# Patient Record
(demographics unavailable — no encounter records)

---

## 2019-10-21 NOTE — DIAGNOSTIC IMAGING REPORT
INDICATION: Cough.



Upright AP view of the chest is obtained with comparison made to

study of one day earlier.



FINDINGS: Heart size remains within normal limits. Pulmonary

vascularity has increased and there are prominent interstitial

markings throughout the lungs. No pneumothorax or consolidation

is identified. Degenerative changes are present at the right

acromioclavicular joint.



IMPRESSION: Increasing interstitial markings in the lungs may be

due to edema or pneumonitis. No consolidation, pneumothorax or

other adverse change is identified.



Dictated by: 



  Dictated on workstation # [id number]

## 2019-10-21 NOTE — NUR
SEAMUS LUJAN admitted to room 422-1, with an admitting diagnosis of abdominal pain, 
gastric ulcer, uti, colitis, alcoholism, on 10/20/19 from er via wheelchair, accompanied by 
er staff. SEAMUS LUJAN introduced to surroundings, call light, bed controls, phone, TV, 
temperature control, lights, meal times, smoking policy, visitor policy, side rail policy, 
bathrooms and showers.  Patient Rights given to patient in the handbook. SEAMUS LUJAN 
verbalizes understanding that [facility] is not responsible for the loss or damage to any 
personal effects or valuables that are kept in the patients possession during their 
hospitalization.

## 2019-10-21 NOTE — PROGRESS NOTE - SURGERY
Subjective


Time Seen by a Provider:  13:04


Subjective/Events-last exam


Pt seen and examined, no new complaints...still has abdominal pain. States she 

is hungry and wants to eat.


Review of Systems


General:  No Chills, No Night Sweats


Pulmonary:  No Dyspnea, No Cough


Cardiovascular:  No: Chest Pain, Palpitations


Gastrointestinal:  No: Nausea, Vomiting





Objective


Exam





Vital Signs








  Date Time  Temp Pulse Resp B/P (MAP) Pulse Ox O2 Delivery O2 Flow Rate FiO2


 


10/21/19 08:00     97 Room Air  


 


10/21/19 08:00 36.5 71 18 128/73 (91) 97 Room Air  


 


10/21/19 03:00 36.7 72 18 129/72 (91) 96 Room Air  


 


10/21/19 02:06 36.8 68 16 108/67 (81) 96 Room Air  


 


10/21/19 00:54 36.5 79 18 110/70 98 Room Air  


 


10/21/19 00:53 36.5 79 18 110/70 (83) 98 Room Air  


 


10/21/19 00:46 36.0 78 18 103/56 (110) 98   


 


10/21/19 00:45     96 Room Air  


 


10/20/19 21:00 36.0 73 18 152/89 (110) 97   














I & O 


 


 10/21/19





 07:00


 


Intake Total 1310 ml


 


Output Total 600 ml


 


Balance 710 ml





Capillary Refill : Less Than 3 SecondsLess Than 3 Seconds


General Appearance:  No Apparent Distress, Thin


HEENT:  Normal ENT Inspection


Neck:  Full Range of Motion, Normal Inspection


Respiratory:  Chest Non Tender, Lungs Clear, No Accessory Muscle Use, No 

Respiratory Distress


Cardiovascular:  Regular Rate, Rhythm, No Murmur


Gastrointestinal:  normal bowel sounds, soft, no organomegaly, no pulsatile 

mass; No distended, No guarding, No rebound; tenderness (MODERATE LLQ AND 

SUPRAPUBIC TENDERNESS, MILD EPIGASTRIC TENDERNESS); No hernia, No mass





Results


Lab


Laboratory Tests


10/20/19 21:15: 


White Blood Count 9.6, Red Blood Count 3.93L, Hemoglobin 13.6, Hematocrit 39, 

Mean Corpuscular Volume 99, Mean Corpuscular Hemoglobin 35H, Mean Corpuscular He

moglobin Concent 35, Red Cell Distribution Width 11.4, Platelet Count 373, Mean 

Platelet Volume 9.2, Neutrophils (%) (Auto) 61, Lymphocytes (%) (Auto) 30, 

Monocytes (%) (Auto) 7, Eosinophils (%) (Auto) 2, Basophils (%) (Auto) 0, 

Neutrophils # (Auto) 5.8, Lymphocytes # (Auto) 2.9, Monocytes # (Auto) 0.7, 

Eosinophils # (Auto) 0.2, Basophils # (Auto) 0.0, Prothrombin Time 12.9, INR Co

mment 0.9, Activated Partial Thromboplast Time 29, Sodium Level 135, Potassium 

Level 3.7, Chloride Level 100, Carbon Dioxide Level 25, Anion Gap 10, Blood Urea

Nitrogen 16, Creatinine 0.61, Estimat Glomerular Filtration Rate > 60, 

BUN/Creatinine Ratio 26, Glucose Level 86, Calcium Level 8.8, Corrected Calcium 

8.8, Magnesium Level 1.9, Total Bilirubin 0.2, Aspartate Amino Transf (AST/SGOT)

15, Alanine Aminotransferase (ALT/SGPT) 16, Alkaline Phosphatase 126, Total 

Protein 6.8, Albumin 4.0, Amylase Level 56, Lipase 23, Acetaminophen Level < 10L

, Serum Alcohol 118H


10/20/19 21:30: 


Urine Color YELLOW, Urine Clarity CLEAR, Urine pH 5, Urine Specific Gravity 

1.015L, Urine Protein NEGATIVE, Urine Glucose (UA) NEGATIVE, Urine Ketones 

NEGATIVE, Urine Nitrite NEGATIVE, Urine Bilirubin NEGATIVE, Urine Urobilinogen 

NORMAL, Urine Leukocyte Esterase 2+H, Urine RBC (Auto) NEGATIVE, Urine RBC NONE,

Urine WBC 5-10H, Urine Squamous Epithelial Cells 10-25H, Urine Crystals NONE, 

Urine Bacteria TRACE, Urine Casts NONE, Urine Mucus MODERATEH, Urine Trichomonas

LARGEH, Urine Culture Indicated NO, Urine Opiates Screen NEGATIVE, Urine 

Oxycodone Screen NEGATIVE, Urine Methadone Screen NEGATIVE, Urine Propoxyphene 

Screen NEGATIVE, Urine Barbiturates Screen NEGATIVE, Ur Tricyclic 

Antidepressants Screen NEGATIVE, Urine Phencyclidine Screen NEGATIVE, Urine 

Amphetamines Screen NEGATIVE, Urine Methamphetamines Screen NEGATIVE, Urine 

Benzodiazepines Screen NEGATIVE, Urine Cocaine Screen NEGATIVE, Urine 

Cannabinoids Screen NEGATIVE


10/21/19 05:45: 


White Blood Count 10.5, Red Blood Count 3.71L, Hemoglobin 12.5, Hematocrit 37, 

Mean Corpuscular Volume 100H, Mean Corpuscular Hemoglobin 34, Mean Corpuscular 

Hemoglobin Concent 34, Red Cell Distribution Width 11.5, Platelet Count 327, 

Mean Platelet Volume 9.2, Neutrophils (%) (Auto) 81H, Lymphocytes (%) (Auto) 12,

Monocytes (%) (Auto) 6, Eosinophils (%) (Auto) 1, Basophils (%) (Auto) 0, 

Neutrophils # (Auto) 8.5H, Lymphocytes # (Auto) 1.2, Monocytes # (Auto) 0.6, 

Eosinophils # (Auto) 0.1, Basophils # (Auto) 0.0, Sodium Level 134L, Potassium 

Level 3.8, Chloride Level 103, Carbon Dioxide Level 23, Anion Gap 8, Blood Urea 

Nitrogen 12, Creatinine 0.57L, Estimat Glomerular Filtration Rate > 60, BUN/C

reatinine Ratio 21, Glucose Level 123H, Calcium Level 8.0L, Corrected Calcium 

8.5, Total Bilirubin 0.4, Aspartate Amino Transf (AST/SGOT) 13, Alanine 

Aminotransferase (ALT/SGPT) 14, Alkaline Phosphatase 111, Total Protein 5.8L, 

Albumin 3.4





Assessment/Plan


LUQ pain


Gastric Wall thickening





Plan to do EGD today, discussed risks and complications not limited to pain, 

bleeding, infection and even esophageal perforation.  All questions answered to 

pt's satisfaction.





Clinical Quality Measures


DVT/VTE Risk/Contraindication:


Risk Factor Score Per Nursing:  3


RFS Level Per Nursing on Admit:  3=High


Contraindications-Pharm:  Other *list below*











BETH GUERRERO DO               Oct 21, 2019 13:23

## 2019-10-21 NOTE — NUR
SPOKE WITH PT AS WELL AS GOING THRU THE EXT MED HISTORY TO COMPLETE THE MED REC.



PT STATES SHE DOES NOT TAKE ANY PRESCRIPTION MEDS.



OTC MEDS:

IBUPROFEN : 2 TABS Q 6 H PRN 

ASPIRIN 81M DAILY

MTV: 1 DAILY

## 2019-10-21 NOTE — DIAGNOSTIC IMAGING REPORT
PROCEDURE: CT urinary tract, rule out kidney stone.



TECHNIQUE: Multiple contiguous axial images were obtained through

the abdomen and pelvis without the use of intravenous contrast.

Auto Exposure Controls were utilized during the CT exam to meet

ALARA standards for radiation dose reduction. 



INDICATION:  Abdominal pain.



FINDINGS: Unenhanced images of the liver and spleen reveal no

focal abnormality. There is no evidence of gallbladder,

pancreatic, adrenal gland or acute renal abnormality. There is

extensive mural thickening throughout the distal body and antrum

of the stomach. There is apparent ulceration along the greater

curvature of the stomach anteriorly with gas reaching the serosal

surface. No definite free fluid is identified. There is no

organized fluid collection in the abdomen or pelvis. Bladder is

unopacified but otherwise unremarkable. There is no evidence of

appendiceal region inflammation.



IMPRESSION: Findings are compatible with advanced gastritis and

apparent peptic ulcer disease with ventral antral ulceration

resulting in marked thinning of the anterior wall. Endoscopic

assessment would be of use.







Dictated by: 



  Dictated on workstation # [id number]

## 2019-10-21 NOTE — CONSULTATION - SURGERY
History of Present Illness


History of Present Illness


Patient Consulted On(nakul/time)


10/20/19


 23:58


Time Seen by Provider:  23:39


History of Present Illness


Surgery asked to consult regarding LUQ.





HPI: Pt is a 59 yo female who presented to the ER with severe LUQ pain, rating 

it as 10 out of 10.  Pt states she was just sitting watching football, drinking 

a beer "when it came out of nowhere".   Pt states last week she "had a bug" with

nausea and vomiting


but all that eventually went away.  She states she went to work today and had no

problems.  Pt states she has never had pain like this before.  She is not sure 

what makes it worse, only the pain meds have so far made the pain better.   Pain

does not radiate


anywhere.





Allergies and Home Medications


Allergies


Coded Allergies:  


     No Known Drug Allergies (Unverified , 10/20/19)





Patient Home Medication List


Home Medication List Reviewed:  Yes





Past Medical-Social-Family Hx


Patient Social History


Alcohol Use:  Regular Use


Recreational Drug Use:  No


Smoking Status:  Current Everyday Smoker (2 ppd)


Type Used:  Cigarettes


Recent Foreign Travel:  No


Contact w/Someone Who Travel:  No


Recent Infectious Disease Expo:  No


Recent Hopitalizations:  No





Seasonal Allergies


Seasonal Allergies:  No





Surgeries


History of Surgeries:  Yes


Surgeries:  Coronary Stent





Respiratory


History of Respiratory Disorde:  No





Cardiovascular


History of Cardiac Disorders:  No





Neurological


History of Neurological Disord:  No





Reproductive System


GYN History:  Menopausal





Genitourinary


History of Genitourinary Disor:  No





Gastrointestinal


History of Gastrointestinal Di:  No





Musculoskeletal


History of Musculoskeletal Dis:  No





Endocrine


History of Endocrine Disorders:  No





HEENT


History of HEENT Disorders:  No





Cancer


History of Cancer:  No





Psychosocial


History of Psychiatric Problem:  No





Integumentary


History of Skin or Integumenta:  No





Blood Transfusions


History of Blood Disorders:  No





Family Medical History


Significant Family History:  Cancer (Father had Prostate CA), Diabetes (Father),

Hypertension (Mother)





Review of Systems-General


Constitutional:  No chills, No diaphoresis; weakness


EENTM:  No mouth pain, No mouth swelling, No epistaxis


Respiratory:  No cough, No dyspnea on exertion, No hemoptysis, No short of 

breath


Cardiovascular:  No chest pain, No edema, No palpitations


Gastrointestinal:  LUQ; No dysphagia, No hematemesis, No jaundice; nausea, 

vomiting


Genitourinary:  No dysuria, No frequency, No hematuria


Musculoskeletal:  back pain, joint pain, joint swelling, muscle stiffness


Skin:  No change in color, No change in hair/nails


Psychiatric/Neurological:  Denies Anxiety, Denies Depressed, Denies Seizure, 

Denies Tremors


Other


Pt denies any history of abnormal bleeding or bruising





Physical Exam-General Problems


Physical Exam


Vital Signs





Vital Signs - First Documented








 10/20/19





 21:00


 


Temp 36.0


 


Pulse 73


 


Resp 18


 


B/P (MAP) 152/89 (110)


 


Pulse Ox 97





Capillary Refill : Less Than 3 Seconds


General Appearance:  mild distress, thin


Eyes:  Bilateral Eye PERRL, Bilateral Eye EOMI


HEENT:  pharynx normal; No scleral icterus (R), No scleral icterus (L)


Neck:  non-tender, full range of motion, supple


Respiratory:  chest non-tender, lungs clear, normal breath sounds, no 

respiratory distress, no accessory muscle use


Cardiovascular:  regular rate, rhythm, no murmur


Gastrointestinal:  normal bowel sounds, soft, no organomegaly, tenderness (LUQ)


Back:  no CVA tenderness, no vertebral tenderness


Extremities:  normal range of motion, normal inspection, no pedal edema, no calf

tenderness


Neurologic/Psychiatric:  CNs II-XII nml as tested, no motor/sensory deficits, 

alert, normal mood/affect, oriented x 3


Skin:  normal color, warm/dry


Lymphatic:  no adenopathy (neck, axilla or groin)





Data Review


Labs


Laboratory Tests


10/20/19 21:15: 


White Blood Count 9.6, Red Blood Count 3.93L, Hemoglobin 13.6, Hematocrit 39, 

Mean Corpuscular Volume 99, Mean Corpuscular Hemoglobin 35H, Mean Corpuscular 

Hemoglobin Concent 35, Red Cell Distribution Width 11.4, Platelet Count 373, 

Mean Platelet Volume 9.2, Neutrophils (%) (Auto) 61, Lymphocytes (%) (Auto) 30, 

Monocytes (%) (Auto) 7, Eosinophils (%) (Auto) 2, Basophils (%) (Auto) 0, 

Neutrophils # (Auto) 5.8, Lymphocytes # (Auto) 2.9, Monocytes # (Auto) 0.7, 

Eosinophils # (Auto) 0.2, Basophils # (Auto) 0.0, Prothrombin Time 12.9, INR 

Comment 0.9, Activated Partial Thromboplast Time 29, Sodium Level 135, Potassium

Level 3.7, Chloride Level 100, Carbon Dioxide Level 25, Anion Gap 10, Blood Urea

Nitrogen 16, Creatinine 0.61, Estimat Glomerular Filtration Rate > 60, 

BUN/Creatinine Ratio 26, Glucose Level 86, Calcium Level 8.8, Corrected Calcium 

8.8, Magnesium Level 1.9, Total Bilirubin 0.2, Aspartate Amino Transf (AST/SGOT)

15, Alanine Aminotransferase (ALT/SGPT) 16, Alkaline Phosphatase 126, Total 

Protein 6.8, Albumin 4.0, Amylase Level 56, Lipase 23, Acetaminophen Level < 10L

, Serum Alcohol 118H


10/20/19 21:30: 


Urine Color YELLOW, Urine Clarity CLEAR, Urine pH 5, Urine Specific Gravity 

1.015L, Urine Protein NEGATIVE, Urine Glucose (UA) NEGATIVE, Urine Ketones 

NEGATIVE, Urine Nitrite NEGATIVE, Urine Bilirubin NEGATIVE, Urine Urobilinogen 

NORMAL, Urine Leukocyte Esterase 2+H, Urine RBC (Auto) NEGATIVE, Urine RBC NONE,

Urine WBC 5-10H, Urine Squamous Epithelial Cells 10-25H, Urine Crystals NONE, 

Urine Bacteria TRACE, Urine Casts NONE, Urine Mucus MODERATEH, Urine Trichomonas

LARGEH, Urine Culture Indicated NO, Urine Opiates Screen NEGATIVE, Urine 

Oxycodone Screen NEGATIVE, Urine Methadone Screen NEGATIVE, Urine Propoxyphene 

Screen NEGATIVE, Urine Barbiturates Screen NEGATIVE, Ur Tricyclic 

Antidepressants Screen NEGATIVE, Urine Phencyclidine Screen NEGATIVE, Urine 

Amphetamines Screen NEGATIVE, Urine Methamphetamines Screen NEGATIVE, Urine 

Benzodiazepines Screen NEGATIVE, Urine Cocaine Screen NEGATIVE, Urine 

Cannabinoids Screen NEGATIVE





Assessment/Plan


LUQ pain


Gastric Wall thickening





Pt has never had an EGD or colonoscopy; an initial Stat Rad Reading supposedly 

called the CT pending gastric perforation.  She is being admitted to medicine 

with IVF, anti-emetics and pain control.  She will need IV Protonix BID.  She 

needs at


least an EGD and I recommend a colonoscopy; might be able to do EGD during this 

admission.











BETH GUERRERO DO               Oct 21, 2019 00:07

## 2019-10-21 NOTE — HISTORY & PHYSICAL
History of Present Illness


History of Present Illness


Reason for visit/HPI


Patient last night at 7 p.m. had severe left lower quadrant pain.


Patient came out to the emergency room.


CAT scan shows gastric ulcer.


Previous surgeries appendectomy and tubal ligation.


Family history mother diabetic in bed diabetic and cancer prostatic.


This morning pain in the left lower quadrant.


Patient never had EGD or colonoscopy.


Patient did drink some beers yesterday


Date of Admission


Oct 20, 2019 at 23:15


Time Seen by a Provider:  08:43


I consulted on this patient on


10/21/19


 08:43


Attending Physician


Kirill Luna DO


Admitting Physician


No,Local Physician


Consult








Allergies and Home Medications


Allergies


Coded Allergies:  


     No Known Drug Allergies (Unverified , 10/20/19)





Home Medications


Aspirin 81 Mg Tablet.dr, 81 MG PO DAILY, (Reported)





Patient Home Medication List


Home Medication List Reviewed:  Yes





Past Medical-Social-Family Hx


Past Med/Social Hx:  Reviewed and Corrections made


Patient Social History


Alcohol Use:  Regular Use (AT LEAST A 6 PACK OF BEER/DAY, PER PT ON 10/20/19)


Alcohol Beverage of Choice:  Beer


Recreational Drug Use:  No


Smoking Status:  Current Everyday Smoker (2 PPD)


Type Used:  Cigarettes (2 PPD)


Recent Foreign Travel:  No


Contact w/other who traveled:  No


Recent Hopitalizations:  No


Recent Infectious Disease Expo:  No





Seasonal Allergies


Seasonal Allergies:  No





Past Medical History


Surgeries:  Appendectomy, Cardiac, Tubal Ligation


Currently Using BIPAP:  No


Tubal Ligation, Menopausal


History of Blood Disorders:  No





Family History





Diabetes mellitus


  19 MOTHER


FH: prostate cancer


  19 FATHER


Hypertension


  19 FATHER


Cancer (Father had Prostate CA), Diabetes (Father), Hypertension (Mother)





Review of Systems


Constitutional:  no symptoms reported


EENTM:  no symptoms reported


Respiratory:  no symptoms reported


Cardiovascular:  no symptoms reported


Gastrointestinal:  LLQ


Genitourinary:  no symptoms reported


Pregnant:  No





Physical Exam


Vital Signs





Vital Signs - First Documented








 10/20/19 10/21/19





 21:00 00:45


 


Temp 36.0 


 


Pulse 73 


 


Resp 18 


 


B/P (MAP) 152/89 (110) 


 


Pulse Ox 97 


 


O2 Delivery  Room Air





Capillary Refill : Less Than 3 Seconds


Height, Weight, BMI


Height: '"


Weight: lbs. oz. kg; 15.04 BMI


Method:


General Appearance:  No Apparent Distress, Thin


Eyes:  Bilateral Eye Normal Inspection


HEENT:  Normal ENT Inspection


Neck:  Full Range of Motion, Normal Inspection


Respiratory:  Chest Non Tender, Lungs Clear, No Accessory Muscle Use, No 

Respiratory Distress


Cardiovascular:  Regular Rate, Rhythm, No Murmur


Gastrointestinal:  Soft, Other (Abdominal pain and left lower quadrant this 

a.m.)





Assessment/Plan


Assessment and Plan


Abdominal pain left lower quadrant.


Elevated alcohol level.


Gastric ulcer.





Admission Diagnosis


Admission Status:  Inpatient Order (span 2 midnights)


Reason for Inpatient Admission:  


Left lower quadrant abdominal pain.


Vomiting.


Ulcer gastric





Clinical Quality Measures


DVT/VTE Risk/Contraindication:


Risk Factor Score Per Nursing:  3


RFS Level Per Nursing on Admit:  3=High











KIRILL LUNA DO         Oct 21, 2019 08:48

## 2019-10-21 NOTE — ED ABDOMINAL PAIN
General


Chief Complaint:  Abdominal/GI Problems


Stated Complaint:  ABDOMINAL PAIN; GASTRIC ULCER; UTI;  ALCOHOLISM


Nursing Triage Note:  


c/o pain in LLQ, STARTED THIS EVENING, SHARP PAIN 8/10


Sepsis Screen:  No Definite Risk


Source of Information:  Patient





History of Present Illness


Date Seen by Provider:  Oct 20, 2019


Time Seen by Provider:  21:05


Initial Comments


PT ARRIVES VIA POV FROM HOME


STATES SHE STARTED GETTING SICK LAST WEEK-HAD NAUSEA, AND DRY HEAVES. GOT BETTER

AFTER A COUPLE OF DAYS


STATES SHE WAS AT WORK TODAY AND HAD ALOT OF PAIN IN MID UPPER ABDOMEN, BUT WENT

AWAY


STATES TODAY, SHE GOT HOME FROM WORK, SAT DOWN AND DRANK " A BEER" AND BEGAN 

HAVING VERY SUDDEN ONSET OF SEVERE SHARP PAIN IN LLQ. 


PAIN BEGAN AROUND 1945 TONIGHT


PT IS WORSE WITH MOVEMENTS OR STANDING UP STRAIGHT. 


NO NAUSEA TODAY. 


HAD NORMAL BM THIS AM


NO FEVER TODAY


NO URINARY SYMPTOMS 





HAS NOT TAKEN ANYTHING FOR SYMPTOMS 





NO HISTORY OF GI PROBLEMS


STATES SHE HAD HER APPENDIX OUT MANY YEARS AGO, AND HAD BTL YEARS AGO. OTHERWISE

NO ABDOMINAL SURGERIES


DENIES ANY HISTORY OF GI PROBLEMS





PT STATES SHE SMOKES 2 PPD, AND DRINKS AT LEAST A 6 PACK OF BEER EVERY DAY. 


DENIES DRUG USE





PCP: DR. LUNA, NOT SEEN FOR ABOUT 2 YEARS





Allergies and Home Medications


Allergies


Coded Allergies:  


     No Known Drug Allergies (Unverified , 10/20/19)





Home Medications


Aspirin 81 Mg Tablet.dr, 81 MG PO DAILY, (Reported)





Patient Home Medication List


Home Medication List Reviewed:  Yes





Review of Systems


Review of Systems


Constitutional:  no symptoms reported; No chills, No diaphoresis, No dizziness, 

No fever


EENTM:  No Symptoms Reported


Respiratory:  No Symptoms Reported; Denies Cough, Denies Shortness of Air


Cardiovascular:  No Symptoms Reported; Denies Chest Pain, Denies Edema, Denies 

Lightheadedness, Denies Palpitations, Denies Syncope


Gastrointestinal:  See HPI, Abdominal Pain; Denies Constipated, Denies Diarrhea;

Nausea; Denies Poor Fluid Intake, Denies Vomiting


Genitourinary:  No Symptoms Reported


Musculoskeletal:  no symptoms reported; No back pain


Skin:  no symptoms reported


Psychiatric/Neurological:  No Symptoms Reported


Endocrine:  No Symptoms Reported


Hematologic/Lymphatic:  No Symptoms Reported





Past Medical-Social-Family Hx


Past Med/Social Hx:  Reviewed and Corrections made


Patient Social History


Alcohol Use:  Regular Use (AT LEAST A 6 PACK OF BEER/DAY, PER PT ON 10/20/19)


Alcohol Beverage of Choice:  Beer


Recreational Drug Use:  No


Smoking Status:  Current Everyday Smoker (2 PPD)


Type Used:  Cigarettes (2 PPD)


Recent Foreign Travel:  No


Contact w/Someone Who Travel:  No


Recent Infectious Disease Expo:  No


Recent Hopitalizations:  No


Physical Abuse:  No


Sexual Abuse:  No


Mistreated:  No


Fear:  No





Seasonal Allergies


Seasonal Allergies:  No





Past Medical History


Surgeries:  Yes (CARDIAC CATH 11  YEARS AGO--NO INTERVENTION, PER PT ON 

10/20/19)


Appendectomy, Cardiac, Tubal Ligation


Respiratory:  No


Cardiac:  No


Neurological:  No


GYN History:  Tubal Ligation, Menopausal


Genitourinary:  No


Gastrointestinal:  No


Musculoskeletal:  No


Endocrine:  No


HEENT:  No


Cancer:  No


Psychosocial:  No


Integumentary:  No


Blood Disorders:  No





Family Medical History





Diabetes mellitus


  19 MOTHER


FH: prostate cancer


  19 FATHER


Hypertension


  19 FATHER


Cancer (Father had Prostate CA), Diabetes (Father), Hypertension (Mother)





Physical Exam


Vital Signs





Vital Signs - First Documented








 10/20/19





 21:00


 


Temp 36.0


 


Pulse 73


 


Resp 18


 


B/P (MAP) 152/89 (110)


 


Pulse Ox 97





Capillary Refill : Less Than 3 Seconds


Height/Weight/BMI


Height: '"


Weight: lbs. oz. kg; 15.04 BMI


Method:


General Appearance:  no apparent distress, thin, other (REEKS OF ETOH, SMILLING,

TALKS NON-STOP AT LENGTH. DOES NOT APPEAR TO BE IN ANY DISCOMFORT OR DISTRESS)


HEENT:  PERRL/EOMI


Neck:  normal inspection


Respiratory:  normal breath sounds, no respiratory distress, no accessory muscle

use


Cardiovascular:  normal peripheral pulses, regular rate, rhythm, no edema, no 

JVD, no murmur


Gastrointestinal:  normal bowel sounds, soft, no organomegaly, no pulsatile 

mass; No distended, No guarding, No rebound; tenderness (MODERATE LLQ AND 

SUPRAPUBIC TENDERNESS, MILD EPIGASTRIC TENDERNESS); No hernia, No mass


Extremities:  normal range of motion, non-tender, normal inspection, no pedal 

edema, no calf tenderness, normal capillary refill


Back:  normal inspection, no CVA tenderness, no vertebral tenderness


Neurologic/Psychiatric:  CNs II-XII nml as tested, no motor/sensory deficits, 

alert, normal mood/affect, oriented x 3


Skin:  normal color, warm/dry





Progress/Results/Core Measures


Results/Orders


Lab Results





Laboratory Tests








Test


 10/20/19


21:15 10/20/19


21:30 Range/Units


 


 


White Blood Count


 9.6 


 


 4.3-11.0


10^3/uL


 


Red Blood Count


 3.93 L


 


 4.35-5.85


10^6/uL


 


Hemoglobin 13.6   11.5-16.0  G/DL


 


Hematocrit 39   35-52  %


 


Mean Corpuscular Volume 99   80-99  FL


 


Mean Corpuscular Hemoglobin 35 H  25-34  PG


 


Mean Corpuscular Hemoglobin


Concent 35 


 


 32-36  G/DL





 


Red Cell Distribution Width 11.4   10.0-14.5  %


 


Platelet Count


 373 


 


 130-400


10^3/uL


 


Mean Platelet Volume 9.2   7.4-10.4  FL


 


Neutrophils (%) (Auto) 61   42-75  %


 


Lymphocytes (%) (Auto) 30   12-44  %


 


Monocytes (%) (Auto) 7   0-12  %


 


Eosinophils (%) (Auto) 2   0-10  %


 


Basophils (%) (Auto) 0   0-10  %


 


Neutrophils # (Auto) 5.8   1.8-7.8  X 10^3


 


Lymphocytes # (Auto) 2.9   1.0-4.0  X 10^3


 


Monocytes # (Auto) 0.7   0.0-1.0  X 10^3


 


Eosinophils # (Auto)


 0.2 


 


 0.0-0.3


10^3/uL


 


Basophils # (Auto)


 0.0 


 


 0.0-0.1


10^3/uL


 


Prothrombin Time 12.9   12.2-14.7  SEC


 


INR Comment 0.9   0.8-1.4  


 


Activated Partial


Thromboplast Time 29 


 


 24-35  SEC





 


Sodium Level 135   135-145  MMOL/L


 


Potassium Level 3.7   3.6-5.0  MMOL/L


 


Chloride Level 100     MMOL/L


 


Carbon Dioxide Level 25   21-32  MMOL/L


 


Anion Gap 10   5-14  MMOL/L


 


Blood Urea Nitrogen 16   7-18  MG/DL


 


Creatinine


 0.61 


 


 0.60-1.30


MG/DL


 


Estimat Glomerular Filtration


Rate > 60 


 


  





 


BUN/Creatinine Ratio 26    


 


Glucose Level 86     MG/DL


 


Calcium Level 8.8   8.5-10.1  MG/DL


 


Corrected Calcium 8.8   8.5-10.1  MG/DL


 


Magnesium Level 1.9   1.6-2.4  MG/DL


 


Total Bilirubin 0.2   0.1-1.0  MG/DL


 


Aspartate Amino Transf


(AST/SGOT) 15 


 


 5-34  U/L





 


Alanine Aminotransferase


(ALT/SGPT) 16 


 


 0-55  U/L





 


Alkaline Phosphatase 126     U/L


 


Total Protein 6.8   6.4-8.2  GM/DL


 


Albumin 4.0   3.2-4.5  GM/DL


 


Amylase Level 56     U/L


 


Lipase 23   8-78  U/L


 


Acetaminophen Level < 10 L  10-30  UG/ML


 


Serum Alcohol 118 H  <10  MG/DL


 


Urine Color  YELLOW   


 


Urine Clarity  CLEAR   


 


Urine pH  5  5-9  


 


Urine Specific Gravity  1.015 L 1.016-1.022  


 


Urine Protein  NEGATIVE  NEGATIVE  


 


Urine Glucose (UA)  NEGATIVE  NEGATIVE  


 


Urine Ketones  NEGATIVE  NEGATIVE  


 


Urine Nitrite  NEGATIVE  NEGATIVE  


 


Urine Bilirubin  NEGATIVE  NEGATIVE  


 


Urine Urobilinogen  NORMAL  NORMAL  MG/DL


 


Urine Leukocyte Esterase  2+ H NEGATIVE  


 


Urine RBC (Auto)  NEGATIVE  NEGATIVE  


 


Urine RBC  NONE   /HPF


 


Urine WBC  5-10 H  /HPF


 


Urine Squamous Epithelial


Cells 


 10-25 H


  /HPF





 


Urine Crystals  NONE   /LPF


 


Urine Bacteria  TRACE   /HPF


 


Urine Casts  NONE   /LPF


 


Urine Mucus  MODERATE H  /LPF


 


Urine Trichomonas  LARGE H  /HPF


 


Urine Culture Indicated  NO   


 


Urine Opiates Screen  NEGATIVE  NEGATIVE  


 


Urine Oxycodone Screen  NEGATIVE  NEGATIVE  


 


Urine Methadone Screen  NEGATIVE  NEGATIVE  


 


Urine Propoxyphene Screen  NEGATIVE  NEGATIVE  


 


Urine Barbiturates Screen  NEGATIVE  NEGATIVE  


 


Ur Tricyclic Antidepressants


Screen 


 NEGATIVE 


 NEGATIVE  





 


Urine Phencyclidine Screen  NEGATIVE  NEGATIVE  


 


Urine Amphetamines Screen  NEGATIVE  NEGATIVE  


 


Urine Methamphetamines Screen  NEGATIVE  NEGATIVE  


 


Urine Benzodiazepines Screen  NEGATIVE  NEGATIVE  


 


Urine Cocaine Screen  NEGATIVE  NEGATIVE  


 


Urine Cannabinoids Screen  NEGATIVE  NEGATIVE  








My Orders





Orders - ROSALINA SANCHEZ DO


Ed Iv/Invasive Line Start (10/20/19 21:26)


Acetaminophen (10/20/19 21:26)


Alcohol (10/20/19 21:26)


Amylase (10/20/19 21:26)


Cbc With Automated Diff (10/20/19 21:26)


Comprehensive Metabolic Panel (10/20/19 21:26)


Drug Screen Stat (Urine) (10/20/19 21:26)


Lipase (10/20/19 21:26)


Magnesium (10/20/19 21:26)


Protime With Inr (10/20/19 21:26)


Partial Thromboplastin Time (10/20/19 21:26)


Ua Culture If Indicated (10/20/19 21:26)


Ed Iv/Invasive Line Start (10/20/19 21:26)


Lactated Ringers (Lr 1000 Ml Iv Solution (10/20/19 21:26)


Ct Abd/Pelvis Wo(Kidney Stone) (10/20/19 21:26)


Acute Abd Series (10/20/19 21:26)


Ketorolac Injection (Toradol Injection) (10/20/19 21:30)


Ceftriaxone  For Iv Use (Rocephin  For I (10/20/19 22:45)


Metronidazole 500mg/100ml Ivpb (Flagyl 5 (10/20/19 22:45)


Ceftriaxone  For Iv Use (Rocephin  For I (10/20/19 22:46)


Water (Sterile) For Injection (Sterile W (10/20/19 22:46)


Pantoprazole Injection (Protonix Injecti (10/20/19 23:15)





Medications Given in ED





Current Medications








 Medications  Dose


 Ordered  Sig/Issac


 Route  Start Time


 Stop Time Status Last Admin


Dose Admin


 


 Ceftriaxone


 Sodium 1000 mg/


 Sterile Water  10 ml @ 


 200 mls/hr  ONCE  ONCE


 IV  10/20/19 22:45


 10/20/19 23:08 DC 10/20/19 22:52


200 MLS/HR


 


 Ketorolac


 Tromethamine  30 mg  ONCE  ONCE


 IVP  10/20/19 21:30


 10/20/19 21:31 DC 10/20/19 22:09


30 MG


 


 Lactated Ringer's  1,000 ml @ 


 0 mls/hr  Q0M ONCE


 IV  10/20/19 21:26


 10/20/19 21:31 DC 10/20/19 22:09


1,000 MLS/HR


 


 Metronidazole  100 ml @ 


 100 mls/hr  ONCE  ONCE


 IV  10/20/19 22:45


 10/20/19 23:44 DC 10/20/19 22:52


100 MLS/HR


 


 Pantoprazole  80 mg  ONCE  ONCE


 IV  10/20/19 23:15


 10/20/19 23:16 DC 10/20/19 23:41


80 MG








Vital Signs/I&O











 10/20/19





 21:00


 


Temp 36.0


 


Pulse 73


 


Resp 18


 


B/P (MAP) 152/89 (110)


 


Pulse Ox 97














 10/21/19





 00:00


 


Intake Total 1010 ml


 


Balance 1010 ml














Blood Pressure Mean:                    91











Progress


Progress Note :  


Progress Note


GIVEN TORADOL FOR PAIN WITH COMPLETE RELIEF OF PAIN


NO DETERIORATION IN PT'S CONDITION DURING ER STAY


PT HAD NO OTHER COMPLAINTS FOR REMAINDER OF ER STAY





Diagnostic Imaging





Comments


ABDOMEN XRAYS--NA ACUTE PROCESS, PENDING RADIOLOGIST REVIEW





CT ABDOMEN / PELVIS--EVIDENCE OF GASTRIC ULCER WITH ABNORMAL WALL THICKENING OF 

GASTRIC ANTRUM, ASSOCIATED WITH GAS AND FLUID EXTENDING TO SEROSAL SURFACE OF 

GREATER CURVATURE OF STOMACH, STRANDING OF MESENTERIC FAC. NO FREE AIR OR FLUID 

COLLECTION. SUSPICIOUS FOR IMPENDING PERFORATION--PER RADIOLOGIST VIA PHONE AT 

2304


   Reviewed:  Reviewed by Me, Discussed w/Radiologist, Reviewed/Discussed





Departure


Communication (Admissions)


2308--SPOKE WITH DR. GUERRERO, ALREADY HERE IN ER TO SEE ANOTHER PT.  HE WILL BE 

IN TO SEE PT SHORTLY. 


2313--SPOKE WITH DR. PECK, HOSPITALIST. ACCEPTS PT FOR ADMIT. 


2355--DR. GUERRERO IN TO SEE PT.





Impression





   Primary Impression:  


   Gastric ulcer


   Additional Impressions:  


   UTI (urinary tract infection)


   Alcohol intoxication in active alcoholic


Disposition:  09 ADMITTED AS INPATIENT


Condition:  Improved





Admissions


Decision to Admit Reason:  Admit from ER (General)


Decision to Admit/Date:  Oct 20, 2019


Time/Decision to Admit Time:  23:15





Departure-Patient Inst.


Referrals:  


NO,LOCAL PHYSICIAN (PCP)


Primary Care Physician











ROSALINA SANCHEZ DO                 Oct 21, 2019 06:54

## 2019-10-21 NOTE — DIAGNOSTIC IMAGING REPORT
INDICATION: Abdominal pain.



COMPARISON: CT abdomen and pelvis performed earlier same day.



FINDINGS: Lungs are clear. No pleural effusion or pneumothorax.

Normal cardiomediastinal silhouette. No free intraperitoneal air.

Nonobstructive bowel gas pattern. No radiopaque urinary tract

calculi. Normal regional skeleton.



IMPRESSION:

1. No acute process by radiography.

2. Please see report for CT abdomen and pelvis for details of

gastritis and gastric ulcer.



Dictated by: 



  Dictated on workstation # [id number]

## 2019-10-22 NOTE — PROGRESS NOTE
Subjective


Time Seen by a Provider:  08:15


Subjective/Events-last exam


UA shows Trichomonas put on Flagyl.


Patient states still has some soreness and abdomen.


Comes and goes.


2 for all the time.


White blood cell count elevated to 15,000.


Abdomen is more rigid than yesterday





Objective


Exam





Vital Signs








  Date Time  Temp Pulse Resp B/P (MAP) Pulse Ox O2 Delivery O2 Flow Rate FiO2


 


10/22/19 08:08     97 Room Air  


 


10/22/19 04:00 37.2 83 18 119/68 (85) 97 Room Air  


 


10/21/19 23:35 37.7 77 18 101/55 (70) 95 Room Air  


 


10/21/19 20:00 37.0 80 16 116/65 (82) 94 Room Air  


 


10/21/19 20:00      Room Air  


 


10/21/19 16:00 36.1 62 16 120/73 (89) 97 Room Air  


 


10/21/19 13:50  59 18  100 Room Air  


 


10/21/19 13:45  53 16  100 OxyMask 8 


 


10/21/19 13:40  61 16  100 OxyMask 8 


 


10/21/19 13:35  62 16  100 OxyMask 8 


 


10/21/19 12:00 36.6 61 18 108/67 (81) 97 Room Air  














I & O 


 


 10/22/19





 07:00


 


Intake Total 2000 ml


 


Output Total 3500 ml


 


Balance -1500 ml





Capillary Refill : Less Than 3 SecondsLess Than 3 Seconds


General Appearance:  No Apparent Distress, Thin


HEENT:  Normal ENT Inspection


Neck:  Full Range of Motion


Respiratory:  No Accessory Muscle Use, No Respiratory Distress


Cardiovascular:  Regular Rate, Rhythm, No Murmur


Gastrointestinal:  guarding, other (More rigid than yesterday)





Results


Lab


Laboratory Tests


10/22/19 05:26








Laboratory Tests


10/22/19 05:26: 


White Blood Count 15.2H, Red Blood Count 3.97L, Hemoglobin 13.1, Hematocrit 40, 

Mean Corpuscular Volume 101H, Mean Corpuscular Hemoglobin 33, Mean Corpuscular 

Hemoglobin Concent 33, Red Cell Distribution Width 11.6, Platelet Count 394, 

Mean Platelet Volume 9.6, Neutrophils (%) (Auto) 82H, Lymphocytes (%) (Auto) 10L

, Monocytes (%) (Auto) 8, Eosinophils (%) (Auto) 0, Basophils (%) (Auto) 0, 

Neutrophils # (Auto) 12.5H, Lymphocytes # (Auto) 1.5, Monocytes # (Auto) 1.2H, E

osinophils # (Auto) 0.1, Basophils # (Auto) 0.0, Neutrophils % (Manual) 72, 

Lymphocytes % (Manual) 11, Monocytes % (Manual) 10, Eosinophils % (Manual) 1, 

Basophils % (Manual) 0, Band Neutrophils 6, Macrocytosis SLIGHT, Sodium Level 

136, Potassium Level 3.7, Chloride Level 102, Carbon Dioxide Level 23, Anion Gap

11, Blood Urea Nitrogen 4L, Creatinine 0.56L, Estimat Glomerular Filtration Rate

> 60, BUN/Creatinine Ratio 7, Glucose Level 113H, Calcium Level 8.5, Corrected 

Calcium 8.9, Total Bilirubin 0.5, Aspartate Amino Transf (AST/SGOT) 13, Alanine 

Aminotransferase (ALT/SGPT) 13, Alkaline Phosphatase 100, Total Protein 6.3L, 

Albumin 3.5





Assessment/Plan


Assessment/Plan


Assess & Plan/Chief Complaint


Trichomonas.


Abdominal pain.


Abdomen more rigid.


Gastric ulcer.


Flatplate and upright of the abdomen ordered





Clinical Quality Measures


Admission Status


Admission Dx


Abdominal pain left lower quadrant.


Elevated alcohol level.


Gastric ulcer.





DVT/VTE Risk/Contraindication:


Risk Factor Score Per Nursing:  3


RFS Level Per Nursing on Admit:  3=High


Contraindications-Pharm:  Other *list below*











GÓMEZ LUNA DO         Oct 22, 2019 08:17

## 2019-10-22 NOTE — DIAGNOSTIC IMAGING REPORT
PATIENT HISTORY: Increased abdominal pain, rigid abdomen. 



TECHNIQUE: Upright and supine frontal views of the abdomen.



COMPARISON: 10/20/2019



FINDINGS:

There is a small to moderate amount of stool in the colon. There

is gas throughout the small bowel, without significant distention

to indicate obstruction. No large collection of free air seen.



IMPRESSION: 



1. Gas-filled loops of small bowel without significant

distention. No large collection of free air.



Dictated by: 



  Dictated on workstation # [id number]

## 2019-10-22 NOTE — PROGRESS NOTE - SURGERY
SHIRLENEGERI [facility] 10/22/19 0847:


Subjective


Date Seen by a Provider:  Oct 22, 2019


Time Seen by a Provider:  08:25


Subjective/Events-last exam


Patient states that she has generalized soreness, but that her abdominal pain is

much better and rated it as an 0/10 but the pain does come in waves. Patient 

states that she had something to eat last evening, but has only had water since.

Patient has been passing gas, but has not had a bowel movement.


Review of Systems


General:  No Chills, No Other (fevers)


Pulmonary:  No Dyspnea


Cardiovascular:  No: Chest Pain, Palpitations


Gastrointestinal:  Abdominal Pain; No: Nausea, Vomiting


Genitourinary:  No Other (No complaints)


Musculoskeletal:  other (generalized soreness)





Objective


Exam





Vital Signs








  Date Time  Temp Pulse Resp B/P (MAP) Pulse Ox O2 Delivery O2 Flow Rate FiO2


 


10/22/19 08:08     97 Room Air  


 


10/22/19 04:00 37.2 83 18 119/68 (85) 97 Room Air  


 


10/21/19 23:35 37.7 77 18 101/55 (70) 95 Room Air  


 


10/21/19 20:00 37.0 80 16 116/65 (82) 94 Room Air  


 


10/21/19 20:00      Room Air  


 


10/21/19 16:00 36.1 62 16 120/73 (89) 97 Room Air  


 


10/21/19 13:50  59 18  100 Room Air  


 


10/21/19 13:45  53 16  100 OxyMask 8 


 


10/21/19 13:40  61 16  100 OxyMask 8 


 


10/21/19 13:35  62 16  100 OxyMask 8 


 


10/21/19 12:00 36.6 61 18 108/67 (81) 97 Room Air  














I & O 


 


 10/22/19





 07:00


 


Intake Total 2000 ml


 


Output Total 3500 ml


 


Balance -1500 ml





Capillary Refill : Less Than 3 SecondsLess Than 3 Seconds


General Appearance:  No Apparent Distress, Thin


Neck:  Tender Lateral


Respiratory:  Chest Non Tender, Lungs Clear, No Accessory Muscle Use, No 

Respiratory Distress


Cardiovascular:  Regular Rate, Rhythm, No Edema, No Murmur


Peripheral Pulses:  2+ Radial Pulses (R), 2+ Radial Pulses (L)


Gastrointestinal:  guarding, tenderness (in the epigastric area)


Extremity:  Normal Inspection, No Calf Tenderness, No Pedal Edema


Neurologic/Psychiatric:  Alert, Oriented x3


Skin:  Normal Color, Warm/Dry





Results


Lab


Laboratory Tests


10/22/19 05:26: 


White Blood Count 15.2H, Red Blood Count 3.97L, Hemoglobin 13.1, Hematocrit 40, 

Mean Corpuscular Volume 101H, Mean Corpuscular Hemoglobin 33, Mean Corpuscular 

Hemoglobin Concent 33, Red Cell Distribution Width 11.6, Platelet Count 394, 

Mean Platelet Volume 9.6, Neutrophils (%) (Auto) 82H, Lymphocytes (%) (Auto) 10L

, Monocytes (%) (Auto) 8, Eosinophils (%) (Auto) 0, Basophils (%) (Auto) 0, 

Neutrophils # (Auto) 12.5H, Lymphocytes # (Auto) 1.5, Monocytes # (Auto) 1.2H, 

Eosinophils # (Auto) 0.1, Basophils # (Auto) 0.0, Neutrophils % (Manual) 72, 

Lymphocytes % (Manual) 11, Monocytes % (Manual) 10, Eosinophils % (Manual) 1, 

Basophils % (Manual) 0, Band Neutrophils 6, Macrocytosis SLIGHT, Sodium Level 

136, Potassium Level 3.7, Chloride Level 102, Carbon Dioxide Level 23, Anion Gap

11, Blood Urea Nitrogen 4L, Creatinine 0.56L, Estimat Glomerular Filtration Rate

> 60, BUN/Creatinine Ratio 7, Glucose Level 113H, Calcium Level 8.5, Corrected 

Calcium 8.9, Total Bilirubin 0.5, Aspartate Amino Transf (AST/SGOT) 13, Alanine 

Aminotransferase (ALT/SGPT) 13, Alkaline Phosphatase 100, Total Protein 6.3L, 

Albumin 3.5





Assessment/Plan


Trichomonas.


Epigastric Abdominal pain.


Gastric ulcer.





 lifestyle changes which could be a cause for the Gastric ulcer. (smoking

and alcohol)


Continue PPI's, and antibiotics





Clinical Quality Measures


DVT/VTE Risk/Contraindication:


Risk Factor Score Per Nursing:  3


RFS Level Per Nursing on Admit:  3=High


Contraindications-Pharm:  Other *list below*





BETH GIPSON DO 10/22/19 2220:


Subjective


Time Seen by a Provider:  13:02


Subjective/Events-last exam


Pt seen and examined.  State she doesn't have pain all over, just one spot in 

LUQ.  Pt wants to eat more.





Objective


Exam


Respiratory:  Lungs Clear


Gastrointestinal:  guarding (voluntary), tenderness (more so LUQ to left mid 

abdomen)





Assessment/Plan


Gastric Ulcer


Leukocytosis





Looked at the X-ray, no free air.  Pt told she must take the PPI and Carafate.  

Will lulu labs in am and if WBC comes down pt probably ok to go home.





Supervisory-Addendum Brief


Verification & Attestation


Participated in pt care:  history, MDM, physical


Personally performed:  exam, history, MDM


Care discussed with:  Medical Student


Procedures:  n/a


Verification and Attestation of Medical Student E/M Service





A medical student performed and documented this service in my presence. I 

reviewed and verified all information documented by the medical student and made

modifications to such information, when appropriate. I personally performed the 

physical exam and medical decision making. 





 Beth Gipson, Oct 22, 2019,22:20











GERI GARBER [facility]    Oct 22, 2019 08:47


BETH GIPSON DO               Oct 22, 2019 22:20

## 2019-10-23 NOTE — PROGRESS NOTE
Subjective


Time Seen by a Provider:  08:19


Subjective/Events-last exam


Patient feeling better and doing better today.


No abdominal pain.


White blood cell count down to 10,000 better than yesterday.


Patient afebrile.


Patient eating good.





Objective


Exam





Vital Signs








  Date Time  Temp Pulse Resp B/P (MAP) Pulse Ox O2 Delivery O2 Flow Rate FiO2


 


10/23/19 04:24 36.7 66 18 122/75 (91) 96 Room Air  


 


10/23/19 00:00 36.3 76 20 103/64 (77) 95 Room Air  


 


10/22/19 20:45      Room Air  


 


10/22/19 20:03 36.4 78 16 101/62 (75) 97 Room Air  


 


10/22/19 16:06 37.2 91 16 105/59 (74) 98 Room Air  


 


10/22/19 12:43 37.2 83 16 128/78 (95) 96 Room Air  














I & O 


 


 10/23/19





 07:00


 


Intake Total 3550.2 ml


 


Output Total 1900 ml


 


Balance 1650.2 ml





Capillary Refill : Less Than 3 SecondsLess Than 3 Seconds


General Appearance:  No Apparent Distress, WD/WN, Thin


HEENT:  Normal ENT Inspection


Neck:  Full Range of Motion, Normal Inspection


Respiratory:  Lungs Clear, No Accessory Muscle Use, No Respiratory Distress


Cardiovascular:  Regular Rate, Rhythm


Gastrointestinal:  non tender, soft





Results


Lab


Laboratory Tests


10/23/19 04:55: 


White Blood Count 10.4, Red Blood Count 3.55L, Hemoglobin 11.8, Hematocrit 36, 

Mean Corpuscular Volume 102H, Mean Corpuscular Hemoglobin 33, Mean Corpuscular 

Hemoglobin Concent 33, Red Cell Distribution Width 11.6, Platelet Count 305, 

Mean Platelet Volume 9.3, Sodium Level 136, Potassium Level 4.4, Chloride Level 

104, Carbon Dioxide Level 24, Anion Gap 8, Blood Urea Nitrogen 5L, Creatinine 

0.57L, Estimat Glomerular Filtration Rate > 60, BUN/Creatinine Ratio 9, Glucose 

Level 111H, Calcium Level 8.1L, Corrected Calcium 8.8, Total Bilirubin 0.3, 

Aspartate Amino Transf (AST/SGOT) 14, Alanine Aminotransferase (ALT/SGPT) 11, 

Alkaline Phosphatase 110, Total Protein 5.6L, Albumin 3.1L





Microbiology


10/21/19 MRSA Screen - Final, Complete


           MRSA not isolated





Assessment/Plan


Assessment/Plan


Assess & Plan/Chief Complaint


Trichomonas.


Abdominal pain.


Abdomen more rigid.


Gastric ulcer.


Flatplate and upright of the abdomen ordered.


.


10/23/19.


Abdominal pain.


Gastric ulcer.


Trichomonas





Clinical Quality Measures


Admission Status


Admission Dx


Abdominal pain left lower quadrant.


Elevated alcohol level.


Gastric ulcer.





DVT/VTE Risk/Contraindication:


Risk Factor Score Per Nursing:  3


RFS Level Per Nursing on Admit:  3=High


Contraindications-Pharm:  Other *list below*











GÓMEZ LUNA DO         Oct 23, 2019 08:22

## 2019-10-23 NOTE — PROGRESS NOTE - SURGERY
SHIRLENEGERI [facility] 10/23/19 1153:


Subjective


Date Seen by a Provider:  Oct 23, 2019


Time Seen by a Provider:  07:40


Subjective/Events-last exam


Patient is doing much better. Patient is tolerating diet well. Patient says that

she is still experiencing pain, but describes it as muscle spasms and soreness. 


WBC is now down to 10.4.


Review of Systems


General:  No Chills, No Other (fevers)


Pulmonary:  No Dyspnea; Cough (chronic due to smoking)


Cardiovascular:  No: Chest Pain, Palpitations, Edema


Gastrointestinal:  Abdominal Pain (mild); No: Nausea, Vomiting





Objective


Exam





Vital Signs








  Date Time  Temp Pulse Resp B/P (MAP) Pulse Ox O2 Delivery O2 Flow Rate FiO2


 


10/23/19 08:00      Room Air  


 


10/23/19 08:00 37.0 79 20 140/80 (100) 97 Room Air  


 


10/23/19 04:24 36.7 66 18 122/75 (91) 96 Room Air  


 


10/23/19 00:00 36.3 76 20 103/64 (77) 95 Room Air  


 


10/22/19 20:45      Room Air  


 


10/22/19 20:03 36.4 78 16 101/62 (75) 97 Room Air  


 


10/22/19 16:06 37.2 91 16 105/59 (74) 98 Room Air  


 


10/22/19 12:43 37.2 83 16 128/78 (95) 96 Room Air  














I & O 


 


 10/23/19





 07:00


 


Intake Total 3550.2 ml


 


Output Total 1900 ml


 


Balance 1650.2 ml





Capillary Refill : Less Than 3 SecondsLess Than 3 Seconds


General Appearance:  No Apparent Distress, WD/WN, Thin


Neck:  Non Tender, Supple


Respiratory:  Chest Non Tender, Lungs Clear, No Accessory Muscle Use, No 

Respiratory Distress


Cardiovascular:  Regular Rate, Rhythm, No Murmur


Peripheral Pulses:  2+ Radial Pulses (R), 2+ Radial Pulses (L)


Gastrointestinal:  soft, tenderness (slightly TTP in the epigastric area)


Extremity:  Normal Inspection, No Calf Tenderness, No Pedal Edema


Neurologic/Psychiatric:  Alert, Oriented x3


Skin:  Normal Color, Warm/Dry





Results


Lab


Laboratory Tests


10/23/19 04:55: 


White Blood Count 10.4, Red Blood Count 3.55L, Hemoglobin 11.8, Hematocrit 36, 

Mean Corpuscular Volume 102H, Mean Corpuscular Hemoglobin 33, Mean Corpuscular 

Hemoglobin Concent 33, Red Cell Distribution Width 11.6, Platelet Count 305, 

Mean Platelet Volume 9.3, Sodium Level 136, Potassium Level 4.4, Chloride Level 

104, Carbon Dioxide Level 24, Anion Gap 8, Blood Urea Nitrogen 5L, Creatinine 

0.57L, Estimat Glomerular Filtration Rate > 60, BUN/Creatinine Ratio 9, Glucose 

Level 111H, Calcium Level 8.1L, Corrected Calcium 8.8, Total Bilirubin 0.3, A

spartate Amino Transf (AST/SGOT) 14, Alanine Aminotransferase (ALT/SGPT) 11, 

Alkaline Phosphatase 110, Total Protein 5.6L, Albumin 3.1L





Microbiology


10/21/19 MRSA Screen - Final, Complete


           MRSA not isolated





Assessment/Plan


Trichomonas.


Gastric ulcer





Abdominal pain has decreased. 


Patient can be sent home later today





Clinical Quality Measures


DVT/VTE Risk/Contraindication:


Risk Factor Score Per Nursing:  3


RFS Level Per Nursing on Admit:  3=High


Contraindications-Pharm:  Other *list below*





PREET GIPSON DO 10/23/19 1716:


Subjective


Time Seen by a Provider:  16:26


Subjective/Events-last exam


Pt seen and examined, states she is feeling better and tolerating diet.  She 

wants to go home.





Objective


Exam


Gastrointestinal:  tenderness (slightly TTP in the epigastric area, improved 

compared to yesterday)





Assessment/Plan


Large gastric Ulcer





Pt was told she must take PPI twice daily and the Carafate 4 times a day.  If 

she does not she will almost certainly perforate the ulcer.  Unfortunately it is

still a possibility and we went over signs and symptoms of perforation. 


Pt will follow up in my office, needs colonoscopy as outpt.  She had no 

questions and stated she would take all her meds.





Supervisory-Addendum Brief


Verification & Attestation


Participated in pt care:  history, MDM, physical


Personally performed:  exam, history, MDM


Care discussed with:  Medical Student


Procedures:  n/a


Verification and Attestation of Medical Student E/M Service





A medical student performed and documented this service in my presence. I 

reviewed and verified all information documented by the medical student and made

modifications to such information, when appropriate. I personally performed the 

physical exam and medical decision making. 





 Preet Gipson, Oct 23, 2019,17:16











GERI GARBER [facility]    Oct 23, 2019 11:53


PREET GIPSON DO               Oct 23, 2019 17:16

## 2019-10-23 NOTE — PROGRESS NOTE-POST OPERATIVE
Post-Operative Progess Note


Surgeon (s)/Assistant (s)


Surgeon


BETH GUERRERO DO


Assistant:  none





Pre-Operative Diagnosis


Gastric Wall thickening, Abdominal pain





Post-Operative Diagnosis





Gastric ulcer


Gastritis


Hiatal hernia





Procedure & Operative Findings


Date of Procedure


10/21/19


Procedure Performed/Findings


EGD with bx


Anesthesia Type


IV sedation by CRNA





Estimated Blood Loss


Estimated blood loss (mL):  scant





Specimens/Packing


Specimens Removed


biopsy of rim of ulcer











BETH GUERRERO DO               Oct 23, 2019 19:59

## 2019-10-24 NOTE — DISCHARGE SUMMARY
Diagnosis/Chief Complaint


Date of Admission


Oct 20, 2019 at 23:15


Date of Discharge


Oct 23, 2019 at 17:20


Discharge Diagnosis


Gastritis.


Peptic ulcer disease with ventral antral ulceration.


Abdominal pain left lower quadrant.


Hiatal hernia.


Alcoholic intoxication.


Trichomonas area


Nicotine dependence area


Blood alcohol level between 100/119





Reason Hospital Visit


Patient last night at 7 p.m. had severe left lower quadrant pain.


Patient came out to the emergency room.


CAT scan shows gastric ulcer.


Previous surgeries appendectomy and tubal ligation.


Family history mother diabetic in bed diabetic and cancer prostatic.


This morning pain in the left lower quadrant.


Patient never had EGD or colonoscopy.


Patient did drink some beers yesterday





Discharge Summary


Procedures


EGD


Consultations


Surgeon


Discharge Physical Examination


Allergies:  


Coded Allergies:  


     No Known Drug Allergies (Verified , 6/12/08)


Vitals & I&Os





Vital Signs








  Date Time  Temp Pulse Resp B/P (MAP) Pulse Ox O2 Delivery O2 Flow Rate FiO2


 


10/23/19 17:20        


 


10/23/19 16:00 37.0 74 16  97 Room Air  


 


10/21/19 13:45       8 











Hospital Course


Patient felt better on discharge.


Stop aspirin and Motrin


Labs (last 24 hrs)


Laboratory Tests


10/20/19 21:15: 


White Blood Count 9.6, Red Blood Count 3.93L, Hemoglobin 13.6, Hematocrit 39, 

Mean Corpuscular Volume 99, Mean Corpuscular Hemoglobin 35H, Mean Corpuscular 

Hemoglobin Concent 35, Red Cell Distribution Width 11.4, Platelet Count 373, 

Mean Platelet Volume 9.2, Neutrophils (%) (Auto) 61, Lymphocytes (%) (Auto) 30, 

Monocytes (%) (Auto) 7, Eosinophils (%) (Auto) 2, Basophils (%) (Auto) 0, 

Neutrophils # (Auto) 5.8, Lymphocytes # (Auto) 2.9, Monocytes # (Auto) 0.7, Eosi

nophils # (Auto) 0.2, Basophils # (Auto) 0.0, Prothrombin Time 12.9, INR Comment

0.9, Activated Partial Thromboplast Time 29, Sodium Level 135, Potassium Level 

3.7, Chloride Level 100, Carbon Dioxide Level 25, Anion Gap 10, Blood Urea 

Nitrogen 16, Creatinine 0.61, Estimat Glomerular Filtration Rate > 60, 

BUN/Creatinine Ratio 26, Glucose Level 86, Calcium Level 8.8, Corrected Calcium 

8.8, Magnesium Level 1.9, Total Bilirubin 0.2, Aspartate Amino Transf (AST/SGOT)

15, Alanine Aminotransferase (ALT/SGPT) 16, Alkaline Phosphatase 126, Total 

Protein 6.8, Albumin 4.0, Amylase Level 56, Lipase 23, Acetaminophen Level < 10L

, Serum Alcohol 118H


10/20/19 21:30: 


Urine Color YELLOW, Urine Clarity CLEAR, Urine pH 5, Urine Specific Gravity 

1.015L, Urine Protein NEGATIVE, Urine Glucose (UA) NEGATIVE, Urine Ketones 

NEGATIVE, Urine Nitrite NEGATIVE, Urine Bilirubin NEGATIVE, Urine Urobilinogen 

NORMAL, Urine Leukocyte Esterase 2+H, Urine RBC (Auto) NEGATIVE, Urine RBC NONE,

Urine WBC 5-10H, Urine Squamous Epithelial Cells 10-25H, Urine Crystals NONE, 

Urine Bacteria TRACE, Urine Casts NONE, Urine Mucus MODERATEH, Urine Trichomonas

LARGEH, Urine Culture Indicated NO, Urine Opiates Screen NEGATIVE, Urine 

Oxycodone Screen NEGATIVE, Urine Methadone Screen NEGATIVE, Urine Propoxyphene 

Screen NEGATIVE, Urine Barbiturates Screen NEGATIVE, Ur Tricyclic 

Antidepressants Screen NEGATIVE, Urine Phencyclidine Screen NEGATIVE, Urine 

Amphetamines Screen NEGATIVE, Urine Methamphetamines Screen NEGATIVE, Urine 

Benzodiazepines Screen NEGATIVE, Urine Cocaine Screen NEGATIVE, Urine 

Cannabinoids Screen NEGATIVE


10/21/19 05:45: 


White Blood Count 10.5, Red Blood Count 3.71L, Hemoglobin 12.5, Hematocrit 37, 

Mean Corpuscular Volume 100H, Mean Corpuscular Hemoglobin 34, Mean Corpuscular 

Hemoglobin Concent 34, Red Cell Distribution Width 11.5, Platelet Count 327, 

Mean Platelet Volume 9.2, Neutrophils (%) (Auto) 81H, Lymphocytes (%) (Auto) 12,

Monocytes (%) (Auto) 6, Eosinophils (%) (Auto) 1, Basophils (%) (Auto) 0, 

Neutrophils # (Auto) 8.5H, Lymphocytes # (Auto) 1.2, Monocytes # (Auto) 0.6, 

Eosinophils # (Auto) 0.1, Basophils # (Auto) 0.0, Sodium Level 134L, Potassium 

Level 3.8, Chloride Level 103, Carbon Dioxide Level 23, Anion Gap 8, Blood Urea 

Nitrogen 12, Creatinine 0.57L, Estimat Glomerular Filtration Rate > 60, 

BUN/Creatinine Ratio 21, Glucose Level 123H, Calcium Level 8.0L, Corrected 

Calcium 8.5, Total Bilirubin 0.4, Aspartate Amino Transf (AST/SGOT) 13, Alanine 

Aminotransferase (ALT/SGPT) 14, Alkaline Phosphatase 111, Total Protein 5.8L, 

Albumin 3.4


10/22/19 05:26: 


White Blood Count 15.2H, Red Blood Count 3.97L, Hemoglobin 13.1, Hematocrit 40, 

Mean Corpuscular Volume 101H, Mean Corpuscular Hemoglobin 33, Mean Corpuscular 

Hemoglobin Concent 33, Red Cell Distribution Width 11.6, Platelet Count 394, 

Mean Platelet Volume 9.6, Neutrophils (%) (Auto) 82H, Lymphocytes (%) (Auto) 10L

, Monocytes (%) (Auto) 8, Eosinophils (%) (Auto) 0, Basophils (%) (Auto) 0, 

Neutrophils # (Auto) 12.5H, Lymphocytes # (Auto) 1.5, Monocytes # (Auto) 1.2H, 

Eosinophils # (Auto) 0.1, Basophils # (Auto) 0.0, Sodium Level 136, Potassium 

Level 3.7, Chloride Level 102, Carbon Dioxide Level 23, Anion Gap 11, Blood Urea

Nitrogen 4L, Creatinine 0.56L, Estimat Glomerular Filtration Rate > 60, 

BUN/Creatinine Ratio 7, Glucose Level 113H, Calcium Level 8.5, Corrected Calcium

8.9, Total Bilirubin 0.5, Aspartate Amino Transf (AST/SGOT) 13, Alanine 

Aminotransferase (ALT/SGPT) 13, Alkaline Phosphatase 100, Total Protein 6.3L, 

Albumin 3.5, Neutrophils % (Manual) 72, Lymphocytes % (Manual) 11, Monocytes % 

(Manual) 10, Eosinophils % (Manual) 1, Basophils % (Manual) 0, Band Neutrophils 

6, Macrocytosis SLIGHT


10/23/19 04:55: 


White Blood Count 10.4, Red Blood Count 3.55L, Hemoglobin 11.8, Hematocrit 36, 

Mean Corpuscular Volume 102H, Mean Corpuscular Hemoglobin 33, Mean Corpuscular 

Hemoglobin Concent 33, Red Cell Distribution Width 11.6, Platelet Count 305, 

Mean Platelet Volume 9.3, Sodium Level 136, Potassium Level 4.4, Chloride Level 

104, Carbon Dioxide Level 24, Anion Gap 8, Blood Urea Nitrogen 5L, Creatinine 

0.57L, Estimat Glomerular Filtration Rate > 60, BUN/Creatinine Ratio 9, Glucose 

Level 111H, Calcium Level 8.1L, Corrected Calcium 8.8, Total Bilirubin 0.3, 

Aspartate Amino Transf (AST/SGOT) 14, Alanine Aminotransferase (ALT/SGPT) 11, 

Alkaline Phosphatase 110, Total Protein 5.6L, Albumin 3.1L





Microbiology


10/21/19 MRSA Screen - Final, Complete


           MRSA not isolated





Laboratory Tests


10/20/19 21:15








10/21/19 05:45








10/22/19 05:26








10/23/19 04:55








Pending Labs





Microbiology








 Date/Time


Source Procedure


Growth Status





 


 10/21/19 12:18


Nasal MRSA Screen - Final


MRSA not isolated Complete





Laboratory Tests


10/20/19 21:15: 


White Blood Count 9.6, Red Blood Count 3.93, Hemoglobin 13.6, Hematocrit 39, 

Mean Corpuscular Volume 99, Mean Corpuscular Hemoglobin 35, Mean Corpuscular 

Hemoglobin Concent 35, Red Cell Distribution Width 11.4, Platelet Count 373, 

Mean Platelet Volume 9.2, Neutrophils (%) (Auto) 61, Lymphocytes (%) (Auto) 30, 

Monocytes (%) (Auto) 7, Eosinophils (%) (Auto) 2, Basophils (%) (Auto) 0, 

Neutrophils # (Auto) 5.8, Lymphocytes # (Auto) 2.9, Monocytes # (Auto) 0.7, 

Eosinophils # (Auto) 0.2, Basophils # (Auto) 0.0, Prothrombin Time 12.9, INR 

Comment 0.9, Activated Partial Thromboplast Time 29, Sodium Level 135, Potassium

Level 3.7, Chloride Level 100, Carbon Dioxide Level 25, Anion Gap 10, Blood Urea

Nitrogen 16, Creatinine 0.61, Estimat Glomerular Filtration Rate > 60, BUN/Cr

eatinine Ratio 26, Glucose Level 86, Calcium Level 8.8, Corrected Calcium 8.8, 

Magnesium Level 1.9, Total Bilirubin 0.2, Aspartate Amino Transf (AST/SGOT) 15, 

Alanine Aminotransferase (ALT/SGPT) 16, Alkaline Phosphatase 126, Total Protein 

6.8, Albumin 4.0, Amylase Level 56, Lipase 23, Acetaminophen Level < 10, Serum 

Alcohol 118


10/20/19 21:30: 


Urine Color YELLOW, Urine Clarity CLEAR, Urine pH 5, Urine Specific Gravity 

1.015, Urine Protein NEGATIVE, Urine Glucose (UA) NEGATIVE, Urine Ketones 

NEGATIVE, Urine Nitrite NEGATIVE, Urine Bilirubin NEGATIVE, Urine Urobilinogen 

NORMAL, Urine Leukocyte Esterase 2+, Urine RBC (Auto) NEGATIVE, Urine RBC NONE, 

Urine WBC 5-10, Urine Squamous Epithelial Cells 10-25, Urine Crystals NONE, 

Urine Bacteria TRACE, Urine Casts NONE, Urine Mucus MODERATE, Urine Trichomonas 

LARGE, Urine Culture Indicated NO, Urine Opiates Screen NEGATIVE, Urine 

Oxycodone Screen NEGATIVE, Urine Methadone Screen NEGATIVE, Urine Propoxyphene 

Screen NEGATIVE, Urine Barbiturates Screen NEGATIVE, Ur Tricyclic 

Antidepressants Screen NEGATIVE, Urine Phencyclidine Screen NEGATIVE, Urine 

Amphetamines Screen NEGATIVE, Urine Methamphetamines Screen NEGATIVE, Urine 

Benzodiazepines Screen NEGATIVE, Urine Cocaine Screen NEGATIVE, Urine 

Cannabinoids Screen NEGATIVE


10/21/19 05:45: 


White Blood Count 10.5, Red Blood Count 3.71, Hemoglobin 12.5, Hematocrit 37, 

Mean Corpuscular Volume 100, Mean Corpuscular Hemoglobin 34, Mean Corpuscular 

Hemoglobin Concent 34, Red Cell Distribution Width 11.5, Platelet Count 327, 

Mean Platelet Volume 9.2, Neutrophils (%) (Auto) 81, Lymphocytes (%) (Auto) 12, 

Monocytes (%) (Auto) 6, Eosinophils (%) (Auto) 1, Basophils (%) (Auto) 0, 

Neutrophils # (Auto) 8.5, Lymphocytes # (Auto) 1.2, Monocytes # (Auto) 0.6, 

Eosinophils # (Auto) 0.1, Basophils # (Auto) 0.0, Sodium Level 134, Potassium 

Level 3.8, Chloride Level 103, Carbon Dioxide Level 23, Anion Gap 8, Blood Urea 

Nitrogen 12, Creatinine 0.57, Estimat Glomerular Filtration Rate > 60, 

BUN/Creatinine Ratio 21, Glucose Level 123, Calcium Level 8.0, Corrected Calcium

8.5, Total Bilirubin 0.4, Aspartate Amino Transf (AST/SGOT) 13, Alanine 

Aminotransferase (ALT/SGPT) 14, Alkaline Phosphatase 111, Total Protein 5.8, 

Albumin 3.4


10/22/19 05:26: 


White Blood Count 15.2, Red Blood Count 3.97, Hemoglobin 13.1, Hematocrit 40, 

Mean Corpuscular Volume 101, Mean Corpuscular Hemoglobin 33, Mean Corpuscular 

Hemoglobin Concent 33, Red Cell Distribution Width 11.6, Platelet Count 394, 

Mean Platelet Volume 9.6, Neutrophils (%) (Auto) 82, Lymphocytes (%) (Auto) 10, 

Monocytes (%) (Auto) 8, Eosinophils (%) (Auto) 0, Basophils (%) (Auto) 0, Ne

utrophils # (Auto) 12.5, Lymphocytes # (Auto) 1.5, Monocytes # (Auto) 1.2, 

Eosinophils # (Auto) 0.1, Basophils # (Auto) 0.0, Sodium Level 136, Potassium 

Level 3.7, Chloride Level 102, Carbon Dioxide Level 23, Anion Gap 11, Blood Urea

Nitrogen 4, Creatinine 0.56, Estimat Glomerular Filtration Rate > 60, 

BUN/Creatinine Ratio 7, Glucose Level 113, Calcium Level 8.5, Corrected Calcium 

8.9, Total Bilirubin 0.5, Aspartate Amino Transf (AST/SGOT) 13, Alanine 

Aminotransferase (ALT/SGPT) 13, Alkaline Phosphatase 100, Total Protein 6.3, 

Albumin 3.5, Neutrophils % (Manual) 72, Lymphocytes % (Manual) 11, Monocytes % 

(Manual) 10, Eosinophils % (Manual) 1, Basophils % (Manual) 0, Band Neutrophils 

6, Macrocytosis SLIGHT


10/23/19 04:55: 


White Blood Count 10.4, Red Blood Count 3.55, Hemoglobin 11.8, Hematocrit 36, 

Mean Corpuscular Volume 102, Mean Corpuscular Hemoglobin 33, Mean Corpuscular 

Hemoglobin Concent 33, Red Cell Distribution Width 11.6, Platelet Count 305, 

Mean Platelet Volume 9.3, Sodium Level 136, Potassium Level 4.4, Chloride Level 

104, Carbon Dioxide Level 24, Anion Gap 8, Blood Urea Nitrogen 5, Creatinine 

0.57, Estimat Glomerular Filtration Rate > 60, BUN/Creatinine Ratio 9, Glucose 

Level 111, Calcium Level 8.1, Corrected Calcium 8.8, Total Bilirubin 0.3, Aspa

rtate Amino Transf (AST/SGOT) 14, Alanine Aminotransferase (ALT/SGPT) 11, 

Alkaline Phosphatase 110, Total Protein 5.6, Albumin 3.1





Discharge


Home Medications:





Active Scripts


Active


Tylenol (Acetaminophen) 325 Mg Tablet 650 Mg PO Q4H PRN 30 Days


Pantoprazole Sodium 40 Mg Tablet.dr 40 Mg PO BID 30 Days


Sucralfate 1 Gm Tablet 1 Gm PO ACHS 30 Days


Reported


Multivitamins (Multivitamin) 1 Each Capsule 1 Each PO DAILY


Richard-600 W/Vit D Tablet (Calcium/Vitamin D) 1 Tab Tablet   





Instructions to patient/family


Please see electronic discharge instructions given to patient.





Clinical Quality Measures


DVT/VTE Risk/Contraindication:


Risk Factor Score Per Nursing:  3


RFS Level Per Nursing on Admit:  3=High


Contraindications-Pharm:  Other *list below*











GÓMEZ LUNA DO         Oct 24, 2019 07:25

## 2019-10-24 NOTE — OPERATIVE REPORT
DATE OF SERVICE:  10/21/2019



PREOPERATIVE DIAGNOSES:

Gastric wall thickening and left upper quadrant pain.



POSTOPERATIVE DIAGNOSES:

1.  Gastric ulcer.

2.  Gastritis.

3.  Hiatal hernia.



PROCEDURE:

EGD with biopsy.



SURGEON:

Preet Gipson DO.



FIRST ASSISTANT:

None.



ANESTHESIA:

IV sedation by the CRNA.



SPECIMEN:

Biopsy from the rim of the ulcer.



BLOOD LOSS:

Scant.



FLUIDS:

Per anesthesia.



POSTOPERATIVE CONDITION:

Stable.



INDICATION FOR PROCEDURE:

The patient is a 60-year-old female who had a gastric wall thickening with left

upper quadrant pain and had a CAT scan, which was read by the radiologist as

possibly impending perforation.



FINDINGS:

The patient had a very large gastric ulcer looked like it had healed, did not

look like it perforated.  There was no bleeding at this time.  Also noted to

have some mild gastritis and a hiatal hernia.



PROCEDURE NOTE:

After informed consent was obtained, the patient was brought to the endoscopy

suite and placed on the bed in the left lateral decubitus position.  She was

administered IV sedation by the CRNA who then monitored her vitals the entire

time, heart rate, blood pressure and pulse ox and the scope was inserted down

the mouth through the esophagus into the stomach.  Upon entering the stomach,

noted some mild gastritis, did not really see the ulcer first, but then when

pushed around the corner just in the prepyloric area, antrum, saw very large

what looked like healed ulcer or at least healing ulcer, little bit of fibrinous

covering, no active bleeding.  Pushed into the duodenum.  Duodenum looked fine,

took a picture.  Pulled back and then elected to do a biopsy of the rim of the

ulcer, got 2 good pieces and then retroflexed the scope, saw a small hiatal

hernia, took a picture of this and then suctioned out the air, pulled the scope

up the esophagus and out the mouth.  The patient tolerated the procedure.  She

was recovered in endoscopy suite.





Job ID: [id number]

DocumentID: [id number]

Dictated Date:  10/23/2019 20:02:04

Transcription Date: 10/24/2019 04:02:45

Dictated By: PREET GIPSON DO

## 2020-02-10 NOTE — DIAGNOSTIC IMAGING REPORT
PROCEDURE: CT right upper extremity with contrast.



TECHNIQUE: Axial images were obtained through the right upper

extremity after intravenous contrast and reformatted into coronal

and sagittal oblique planes. Auto Exposure Controls were utilized

during the CT exam to meet ALARA standards for radiation dose

reduction. 



INDICATION:  Right shoulder pain.



COMPARISON: None available.



FINDINGS:



ROTATOR CUFF: There is no full-thickness rotator cuff tear. No

high-grade articular sided tearing of the rotator cuff. No

rotator cuff muscle atrophy.



LONG HEAD OF BICEPS: Long head of biceps has a normal

extracapsular position and its intracapsular segment remains

intact.



GLENOID LABRUM: Glenoid labrum has a normal triangular morphology

without superimposed tear.



BONES AND CARTILAGE: There is partial thickness chondromalacia in

the inferior aspect of the glenohumeral joint. No fracture or

concerning focal osseous lesion. No hypertrophic degenerative

arthritis of the acromioclavicular joint.



SOFT TISSUES: No features of proliferative synovitis. Emphysema

is present throughout the visualized aspects of the right lung.

No right axillary lymphadenopathy.



IMPRESSION:

1. CT arthrogram was performed as patient refused MRI examination

after arthrogram injection.

2. No full-thickness or articular sided rotator cuff tear.

3. Long head of biceps remains intact.

4. No glenoid labral tear.



Dictated by: 



  Dictated on workstation # [id number]

## 2020-02-10 NOTE — DIAGNOSTIC IMAGING REPORT
INDICATION: Shoulder pain.



PROCEDURE: The patient was placed on the table in the supine

position. The patient's shoulder was prepped and draped in the

usual sterile fashion. Local anesthesia was obtained with 2%

lidocaine. A needle was advanced into the glenohumeral joint

under fluoroscopic control. A mixture of saline, Omnipaque and

gadolinium was then infused. The needle was removed and adequate

hemostasis was obtained. The patient tolerated the procedure well

and left the department in stable condition.



IMPRESSION: Successful shoulder injection prior to MRI, as

described above.



Dictated by: 



  Dictated on workstation # [id number]

## 2023-02-13 NOTE — DIAGNOSTIC IMAGING REPORT
INDICATION: 

Routine screening.



COMPARISON:  

No prior studies are available for comparison.



TECHNIQUE: 

2D and 3D bilateral screening mammography was performed with CAD.



FINDINGS:

Both breasts are heterogeneously dense, limiting the sensitivity

of mammography. No spiculated mass or malignant-appearing

microcalcifications are seen. There are vascular calcifications

bilaterally. Axillae are unremarkable.



IMPRESSION: 

No mammographic features suspicious for malignancy are

identified.



ACR BI-RADS Category 1: Negative.

Result letter will be mailed to the patient.

Note:  At least 10% of breast cancer is not imaged by

mammography.



Dictated by: 



  Dictated on workstation # [id number]